# Patient Record
Sex: FEMALE | URBAN - METROPOLITAN AREA
[De-identification: names, ages, dates, MRNs, and addresses within clinical notes are randomized per-mention and may not be internally consistent; named-entity substitution may affect disease eponyms.]

---

## 2021-06-07 ENCOUNTER — OFFICE VISIT (OUTPATIENT)
Dept: URBAN - METROPOLITAN AREA CLINIC 107 | Facility: CLINIC | Age: 46
End: 2021-06-07
Payer: SELF-PAY

## 2021-06-07 ENCOUNTER — DASHBOARD ENCOUNTERS (OUTPATIENT)
Age: 46
End: 2021-06-07

## 2021-06-07 ENCOUNTER — TELEPHONE ENCOUNTER (OUTPATIENT)
Dept: URBAN - METROPOLITAN AREA CLINIC 113 | Facility: CLINIC | Age: 46
End: 2021-06-07

## 2021-06-07 ENCOUNTER — WEB ENCOUNTER (OUTPATIENT)
Dept: URBAN - METROPOLITAN AREA CLINIC 107 | Facility: CLINIC | Age: 46
End: 2021-06-07

## 2021-06-07 VITALS
HEIGHT: 67 IN | SYSTOLIC BLOOD PRESSURE: 102 MMHG | WEIGHT: 196 LBS | TEMPERATURE: 98.3 F | BODY MASS INDEX: 30.76 KG/M2 | HEART RATE: 85 BPM | RESPIRATION RATE: 18 BRPM | DIASTOLIC BLOOD PRESSURE: 70 MMHG

## 2021-06-07 DIAGNOSIS — K58.1 IRRITABLE BOWEL SYNDROME WITH CONSTIPATION: ICD-10-CM

## 2021-06-07 DIAGNOSIS — R10.13 DYSPEPSIA: ICD-10-CM

## 2021-06-07 DIAGNOSIS — R10.30 LOWER ABDOMINAL PAIN: ICD-10-CM

## 2021-06-07 PROBLEM — 440630006: Status: ACTIVE | Noted: 2021-06-07

## 2021-06-07 PROBLEM — 54586004: Status: ACTIVE | Noted: 2021-06-07

## 2021-06-07 PROBLEM — 162031009: Status: ACTIVE | Noted: 2021-06-07

## 2021-06-07 PROCEDURE — 99204 OFFICE O/P NEW MOD 45 MIN: CPT | Performed by: INTERNAL MEDICINE

## 2021-06-07 RX ORDER — DICYCLOMINE HYDROCHLORIDE 10 MG/1
2 CAPSULES CAPSULE ORAL THREE TIMES A DAY
Qty: 180 | OUTPATIENT
Start: 2021-06-07 | End: 2021-07-07

## 2021-06-07 RX ORDER — LORATADINE 10 MG
1 PACKET MIXED WITH 8 OUNCES OF FLUID TABLET,DISINTEGRATING ORAL ONCE A DAY
Qty: 30 | OUTPATIENT
Start: 2021-06-07 | End: 2021-07-07

## 2021-06-07 RX ORDER — ESOMEPRAZOLE MAGNESIUM 40 MG/1
1 CAPSULE CAPSULE, DELAYED RELEASE ORAL ONCE A DAY
OUTPATIENT

## 2021-06-07 RX ORDER — DICYCLOMINE HYDROCHLORIDE 10 MG/1
2 CAPSULES CAPSULE ORAL THREE TIMES A DAY
Qty: 180 | Refills: 0
Start: 2021-06-07 | End: 2021-07-14

## 2021-06-07 RX ORDER — BACILLUS COAGULANS/INULIN 1B-250 MG
AS DIRECTED CAPSULE ORAL
Status: ACTIVE | COMMUNITY

## 2021-06-07 RX ORDER — ESOMEPRAZOLE MAGNESIUM 40 MG/1
1 CAPSULE CAPSULE, DELAYED RELEASE ORAL ONCE A DAY
Status: ACTIVE | COMMUNITY

## 2021-06-07 RX ORDER — SUCRALFATE 1 G
1 TABLET ON AN EMPTY STOMACH TABLET ORAL TWICE A DAY
Status: ACTIVE | COMMUNITY

## 2021-06-07 NOTE — HPI-TODAY'S VISIT:
Ms. Manas Mchugh is a 45-year-old Nepali speaking [place] female self referred for abdominal pain and bloating. Her PCP is at [facility].   A  was used for this visit.  She has been having abdominal pain for many months to the last year.  She visited [place] around 7 months ago and that is when her symptoms started. She had EGD and colonoscopy and abdominal imaging in [place] and was told it was normal.  She was told she had IBS.  She brings with her the endoscopy reports and imaging reports, but they are all in [place].  She also went to [place] and was given Bentyl May 6th, but never started it.  She was given carafate and nexium which hasnt helped.  Her PCP tested her for H. pylori which was negative. The symptoms are worse with eating and slightly improved with defecation.  She does complain of constipation and a small pebble-like stools daily.  The bowel pain is mainly in the lower abdomen and does not radiate.  She also has a burning sensation and sore throat.  She denies nausea, vomiting, change in bowel habits, blood in the stool or weight loss.  No family history of GI cancers.   Colonoscopy 3/3/21 by Dr. Khalil appears to reveal normal ileum, 3 mm ascending colon polyp, 5 mm sigmoid polyp and internal hemorrhoids. EGD 3/17/21 by Dr. Khalil revealed antral gastritis and probable intestinal metaplasia.

## 2021-06-07 NOTE — HPI-OTHER HISTORIES
[place] translation of the endoscopy and imaging reports.  the EGD revealed gastropathy in the antrum of the stomach and intestinal metaplasia at the GE junction. The RUQ US revealed cholelithiasis. The CT scan revealed a right kidney stone and degenerative changes to lumbar spine but no acute abnormalities.